# Patient Record
Sex: MALE | URBAN - METROPOLITAN AREA
[De-identification: names, ages, dates, MRNs, and addresses within clinical notes are randomized per-mention and may not be internally consistent; named-entity substitution may affect disease eponyms.]

---

## 2024-06-05 ENCOUNTER — HOSPITAL ENCOUNTER (EMERGENCY)
Facility: MEDICAL CENTER | Age: 54
End: 2024-06-05
Attending: EMERGENCY MEDICINE

## 2024-06-05 ENCOUNTER — APPOINTMENT (OUTPATIENT)
Dept: RADIOLOGY | Facility: MEDICAL CENTER | Age: 54
End: 2024-06-05
Attending: EMERGENCY MEDICINE

## 2024-06-05 VITALS
SYSTOLIC BLOOD PRESSURE: 93 MMHG | RESPIRATION RATE: 16 BRPM | DIASTOLIC BLOOD PRESSURE: 54 MMHG | HEART RATE: 88 BPM | BODY MASS INDEX: 33.86 KG/M2 | WEIGHT: 250 LBS | TEMPERATURE: 97.3 F | OXYGEN SATURATION: 94 % | HEIGHT: 72 IN

## 2024-06-05 DIAGNOSIS — F10.920 ALCOHOLIC INTOXICATION WITHOUT COMPLICATION (HCC): ICD-10-CM

## 2024-06-05 DIAGNOSIS — S02.2XXA CLOSED FRACTURE OF NASAL BONE, INITIAL ENCOUNTER: ICD-10-CM

## 2024-06-05 LAB — GLUCOSE BLD STRIP.AUTO-MCNC: 94 MG/DL (ref 65–99)

## 2024-06-05 PROCEDURE — 304217 HCHG IRRIGATION SYSTEM

## 2024-06-05 PROCEDURE — 70450 CT HEAD/BRAIN W/O DYE: CPT

## 2024-06-05 PROCEDURE — 82962 GLUCOSE BLOOD TEST: CPT

## 2024-06-05 PROCEDURE — 99285 EMERGENCY DEPT VISIT HI MDM: CPT

## 2024-06-05 NOTE — ED NOTES
Patient wound irrigated with NS per ERPs order, patient tolerated well. Patient given turkey sandwich and water.

## 2024-06-05 NOTE — ED TRIAGE NOTES
Chief Complaint   Patient presents with    Alcohol Intoxication     [place] for [place], upon arrival pt has a lac across the L side of his face, L eye swollen shut , pt not willing to answer any of this RN questions
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
## 2024-06-05 NOTE — ED NOTES
All discharge paperwork provided to pt. Pt verbalized understanding. Pt ambulatory to lobby with steady gait.

## 2024-06-05 NOTE — ED NOTES
"Pt states that he feels to \"dizzy\" at this time to go home. Encouraged pt to lay back down and rest.  Bus pass provided to pt.   "